# Patient Record
Sex: FEMALE | Race: WHITE | NOT HISPANIC OR LATINO | Employment: FULL TIME | URBAN - METROPOLITAN AREA
[De-identification: names, ages, dates, MRNs, and addresses within clinical notes are randomized per-mention and may not be internally consistent; named-entity substitution may affect disease eponyms.]

---

## 2017-08-22 PROBLEM — E04.1 THYROID NODULE: Status: ACTIVE | Noted: 2017-08-22

## 2017-08-22 PROCEDURE — 88175 CYTOPATH C/V AUTO FLUID REDO: CPT | Performed by: INTERNAL MEDICINE

## 2019-10-10 PROCEDURE — 88175 CYTOPATH C/V AUTO FLUID REDO: CPT | Performed by: INTERNAL MEDICINE

## 2022-10-13 ENCOUNTER — WALK IN (OUTPATIENT)
Dept: URGENT CARE | Age: 32
End: 2022-10-13
Attending: EMERGENCY MEDICINE

## 2022-10-13 VITALS
RESPIRATION RATE: 16 BRPM | OXYGEN SATURATION: 100 % | DIASTOLIC BLOOD PRESSURE: 79 MMHG | WEIGHT: 140 LBS | SYSTOLIC BLOOD PRESSURE: 129 MMHG | HEART RATE: 68 BPM | TEMPERATURE: 97.9 F

## 2022-10-13 DIAGNOSIS — J02.9 ACUTE PHARYNGITIS, UNSPECIFIED ETIOLOGY: Primary | ICD-10-CM

## 2022-10-13 DIAGNOSIS — H65.93 BILATERAL SEROUS OTITIS MEDIA, UNSPECIFIED CHRONICITY: ICD-10-CM

## 2022-10-13 PROCEDURE — 99202 OFFICE O/P NEW SF 15 MIN: CPT

## 2022-10-13 RX ORDER — MESALAMINE 1.2 G/1
4 TABLET, DELAYED RELEASE ORAL DAILY
COMMUNITY
Start: 2022-05-02

## 2022-10-13 RX ORDER — MERCAPTOPURINE 50 MG/1
TABLET ORAL
COMMUNITY

## 2022-10-13 RX ORDER — AZITHROMYCIN 500 MG/1
500 TABLET, FILM COATED ORAL DAILY
Qty: 3 TABLET | Refills: 0 | Status: SHIPPED | OUTPATIENT
Start: 2022-10-13 | End: 2022-10-16

## 2022-10-13 ASSESSMENT — ENCOUNTER SYMPTOMS
COUGH: 0
DIARRHEA: 0
HEADACHES: 0
ABDOMINAL PAIN: 0
FEVER: 0
SHORTNESS OF BREATH: 0
BACK PAIN: 0
RHINORRHEA: 1
DIZZINESS: 0
CHILLS: 0
VOMITING: 0
SORE THROAT: 1
BRUISES/BLEEDS EASILY: 0

## 2022-10-13 ASSESSMENT — PAIN SCALES - GENERAL: PAINLEVEL: 7

## 2023-06-10 ENCOUNTER — WALK IN (OUTPATIENT)
Dept: URGENT CARE | Age: 33
End: 2023-06-10
Attending: EMERGENCY MEDICINE

## 2023-06-10 VITALS
OXYGEN SATURATION: 100 % | RESPIRATION RATE: 16 BRPM | SYSTOLIC BLOOD PRESSURE: 121 MMHG | TEMPERATURE: 97.7 F | HEART RATE: 70 BPM | DIASTOLIC BLOOD PRESSURE: 81 MMHG

## 2023-06-10 DIAGNOSIS — R30.0 DYSURIA: Primary | ICD-10-CM

## 2023-06-10 DIAGNOSIS — N30.01 ACUTE CYSTITIS WITH HEMATURIA: ICD-10-CM

## 2023-06-10 LAB
APPEARANCE UR: CLEAR
BILIRUB UR QL STRIP: NEGATIVE
COLOR UR: ABNORMAL
GLUCOSE UR STRIP-MCNC: NEGATIVE MG/DL
HCG UR QL: NEGATIVE
HGB UR QL STRIP: ABNORMAL
KETONES UR STRIP-MCNC: NEGATIVE MG/DL
LEUKOCYTE ESTERASE UR QL STRIP: ABNORMAL
NITRITE UR QL STRIP: NEGATIVE
PH UR STRIP: 7 UNITS (ref 5–7)
PROT UR STRIP-MCNC: 100 MG/DL
SP GR UR STRIP: 1.01 (ref 1–1.03)
UROBILINOGEN UR STRIP-MCNC: 0.2 MG/DL

## 2023-06-10 PROCEDURE — 81003 URINALYSIS AUTO W/O SCOPE: CPT | Performed by: EMERGENCY MEDICINE

## 2023-06-10 PROCEDURE — 99212 OFFICE O/P EST SF 10 MIN: CPT

## 2023-06-10 PROCEDURE — 87086 URINE CULTURE/COLONY COUNT: CPT | Performed by: EMERGENCY MEDICINE

## 2023-06-10 PROCEDURE — 84703 CHORIONIC GONADOTROPIN ASSAY: CPT | Performed by: EMERGENCY MEDICINE

## 2023-06-10 RX ORDER — PHENAZOPYRIDINE HYDROCHLORIDE 100 MG/1
100 TABLET, FILM COATED ORAL 3 TIMES DAILY
Qty: 9 TABLET | Refills: 0 | Status: SHIPPED | OUTPATIENT
Start: 2023-06-10 | End: 2023-06-13

## 2023-06-10 RX ORDER — SULFAMETHOXAZOLE AND TRIMETHOPRIM 800; 160 MG/1; MG/1
1 TABLET ORAL 2 TIMES DAILY
Qty: 14 TABLET | Refills: 0 | Status: SHIPPED | OUTPATIENT
Start: 2023-06-10 | End: 2023-06-17

## 2023-06-10 ASSESSMENT — ENCOUNTER SYMPTOMS
BRUISES/BLEEDS EASILY: 0
CHILLS: 0
ABDOMINAL PAIN: 0
DIZZINESS: 0
SHORTNESS OF BREATH: 0
VOMITING: 0
FEVER: 0
HEADACHES: 0
DIARRHEA: 0
COUGH: 0
BACK PAIN: 0
SORE THROAT: 0

## 2023-06-12 LAB — BACTERIA UR CULT: ABNORMAL

## 2023-08-20 ENCOUNTER — WALK IN (OUTPATIENT)
Dept: URGENT CARE | Age: 33
End: 2023-08-20
Attending: EMERGENCY MEDICINE

## 2023-08-20 VITALS
OXYGEN SATURATION: 99 % | RESPIRATION RATE: 18 BRPM | TEMPERATURE: 99 F | SYSTOLIC BLOOD PRESSURE: 127 MMHG | DIASTOLIC BLOOD PRESSURE: 76 MMHG | HEART RATE: 99 BPM

## 2023-08-20 DIAGNOSIS — J01.90 ACUTE NON-RECURRENT SINUSITIS, UNSPECIFIED LOCATION: Primary | ICD-10-CM

## 2023-08-20 PROCEDURE — 99212 OFFICE O/P EST SF 10 MIN: CPT

## 2023-08-20 RX ORDER — AMOXICILLIN 875 MG/1
875 TABLET, COATED ORAL 2 TIMES DAILY
Qty: 14 TABLET | Refills: 0 | Status: SHIPPED | OUTPATIENT
Start: 2023-08-20 | End: 2023-08-27

## 2023-08-20 ASSESSMENT — ENCOUNTER SYMPTOMS
BACK PAIN: 0
NUMBNESS: 0
CHILLS: 0
SORE THROAT: 1
SHORTNESS OF BREATH: 0
FATIGUE: 1
ABDOMINAL PAIN: 0
COLOR CHANGE: 0
ACTIVITY CHANGE: 0
CONFUSION: 0
DIARRHEA: 0
POLYPHAGIA: 0
POLYDIPSIA: 0
EYE REDNESS: 0
WOUND: 0
SINUS PRESSURE: 1
EYE DISCHARGE: 0
NAUSEA: 0
DIZZINESS: 0
HEADACHES: 0
BRUISES/BLEEDS EASILY: 0
FACIAL SWELLING: 0
COUGH: 1
VOMITING: 0
WHEEZING: 0
EYE PAIN: 0
FEVER: 1

## 2023-08-20 ASSESSMENT — PAIN SCALES - GENERAL: PAINLEVEL: 0

## 2023-10-17 ENCOUNTER — WALK IN (OUTPATIENT)
Dept: URGENT CARE | Age: 33
End: 2023-10-17
Attending: EMERGENCY MEDICINE

## 2023-10-17 VITALS
RESPIRATION RATE: 18 BRPM | HEART RATE: 64 BPM | SYSTOLIC BLOOD PRESSURE: 132 MMHG | OXYGEN SATURATION: 100 % | DIASTOLIC BLOOD PRESSURE: 69 MMHG | TEMPERATURE: 98.5 F

## 2023-10-17 DIAGNOSIS — N39.0 ACUTE UTI: Primary | ICD-10-CM

## 2023-10-17 DIAGNOSIS — R31.0 HEMATURIA, GROSS: ICD-10-CM

## 2023-10-17 LAB
APPEARANCE UR: CLEAR
BILIRUB UR QL STRIP: NEGATIVE
COLOR UR: YELLOW
GLUCOSE UR STRIP-MCNC: NEGATIVE MG/DL
HCG UR QL: NEGATIVE
HGB UR QL STRIP: ABNORMAL
KETONES UR STRIP-MCNC: NEGATIVE MG/DL
LEUKOCYTE ESTERASE UR QL STRIP: ABNORMAL
NITRITE UR QL STRIP: NEGATIVE
PH UR STRIP: 7 UNITS (ref 5–7)
PROT UR STRIP-MCNC: NEGATIVE MG/DL
SP GR UR STRIP: 1.01 (ref 1–1.03)
UROBILINOGEN UR STRIP-MCNC: 0.2 MG/DL

## 2023-10-17 PROCEDURE — 87086 URINE CULTURE/COLONY COUNT: CPT | Performed by: EMERGENCY MEDICINE

## 2023-10-17 PROCEDURE — 84703 CHORIONIC GONADOTROPIN ASSAY: CPT | Performed by: EMERGENCY MEDICINE

## 2023-10-17 PROCEDURE — 99212 OFFICE O/P EST SF 10 MIN: CPT

## 2023-10-17 PROCEDURE — 81003 URINALYSIS AUTO W/O SCOPE: CPT | Performed by: EMERGENCY MEDICINE

## 2023-10-17 RX ORDER — CEPHALEXIN 500 MG/1
500 CAPSULE ORAL 3 TIMES DAILY
Qty: 21 CAPSULE | Refills: 0 | Status: SHIPPED | OUTPATIENT
Start: 2023-10-17 | End: 2023-10-24

## 2023-10-17 ASSESSMENT — PAIN SCALES - GENERAL
PAINLEVEL: 3
PAINLEVEL_OUTOF10: 3

## 2023-10-20 LAB — BACTERIA UR CULT: ABNORMAL

## 2023-10-20 RX ORDER — NITROFURANTOIN 25; 75 MG/1; MG/1
100 CAPSULE ORAL 2 TIMES DAILY
Qty: 14 CAPSULE | Refills: 0 | Status: SHIPPED | OUTPATIENT
Start: 2023-10-20

## 2024-09-03 ENCOUNTER — OFFICE VISIT (OUTPATIENT)
Dept: OBGYN CLINIC | Facility: CLINIC | Age: 34
End: 2024-09-03

## 2024-09-03 VITALS
BODY MASS INDEX: 22 KG/M2 | SYSTOLIC BLOOD PRESSURE: 110 MMHG | HEART RATE: 53 BPM | WEIGHT: 147.38 LBS | DIASTOLIC BLOOD PRESSURE: 70 MMHG

## 2024-09-03 DIAGNOSIS — K51.00 ULCERATIVE PANCOLITIS WITHOUT COMPLICATION (HCC): ICD-10-CM

## 2024-09-03 DIAGNOSIS — Z01.419 WELL WOMAN EXAM WITH ROUTINE GYNECOLOGICAL EXAM: Primary | ICD-10-CM

## 2024-09-03 DIAGNOSIS — Z31.69 PRE-CONCEPTION COUNSELING: ICD-10-CM

## 2024-09-03 PROCEDURE — 99385 PREV VISIT NEW AGE 18-39: CPT | Performed by: OBSTETRICS & GYNECOLOGY

## 2024-09-03 NOTE — PROGRESS NOTES
Angela Villeda is a 34 year old female  Patient's last menstrual period was 08/10/2024 (exact date).   Chief Complaint   Patient presents with    Physical     Annual/family planning   Presenting for well woman exam. Last pap smear was normal  per patient.  She is planning to start trying to conceive this fall. On mesalamine and mercaptopurine; discussed recommendation for preconception consult with MFM.     OBSTETRICS HISTORY:  OB History    Para Term  AB Living   0 0 0 0 0 0   SAB IAB Ectopic Multiple Live Births   0 0 0 0 0       GYNE HISTORY:  Patient's last menstrual period was 08/10/2024 (exact date).    History   Sexual Activity    Sexual activity: Yes    Partners: Male    Birth control/ protection: Condom        Pap Date: 10/01/23  Pap Result Notes: per pt results  Normal      MEDICAL HISTORY:  Past Medical History:    Ulcerative colitis, unspecified    asx x 5 years, on asachol, 6MP         SURGICAL HISTORY:  History reviewed. No pertinent surgical history.    SOCIAL HISTORY:  Social History     Socioeconomic History    Marital status: Single     Spouse name: Not on file    Number of children: Not on file    Years of education: Not on file    Highest education level: Not on file   Occupational History    Not on file   Tobacco Use    Smoking status: Never    Smokeless tobacco: Never   Substance and Sexual Activity    Alcohol use: Yes     Alcohol/week: 2.0 standard drinks of alcohol     Types: 2 Glasses of wine per week    Drug use: No    Sexual activity: Yes     Partners: Male     Birth control/protection: Condom   Other Topics Concern    Not on file   Social History Narrative    Not on file     Social Determinants of Health     Financial Resource Strain: Not on file   Food Insecurity: Not on file   Transportation Needs: Not on file   Physical Activity: Not on file   Stress: Not on file   Social Connections: Not on file   Housing Stability: Not on file         Depression Screening  (PHQ-2/PHQ-9): Over the LAST 2 WEEKS   Little interest or pleasure in doing things (over the last two weeks)?: Not at all    Feeling down, depressed, or hopeless (over the last two weeks)?: Not at all    PHQ-2 SCORE: 0           MEDICATIONS:    Current Outpatient Medications:     MULTIPLE VITAMIN OR, Take by mouth daily., Disp: , Rfl:     mesalamine 1.2 g Oral Tab EC, Take 1 tablet (1.2 g total) by mouth 2 (two) times daily., Disp: , Rfl:     MERCAPTOPURINE OR, 50 mL daily., Disp: , Rfl:     sulfamethoxazole-trimethoprim DS (BACTRIM DS) 800-160 MG Oral Tab per tablet, Take 1 tablet by mouth 2 (two) times daily. (Patient not taking: Reported on 9/3/2024), Disp: 10 tablet, Rfl: 0    ALLERGIES:  No Known Allergies      Review of Systems:  Review of Systems   All other systems reviewed and are negative.       Vitals:    09/03/24 0953   BP: 110/70   Pulse: 53       PHYSICAL EXAM:   Physical Exam  Vitals reviewed.   Constitutional:       Appearance: Normal appearance.   HENT:      Head: Atraumatic.   Eyes:      Pupils: Pupils are equal, round, and reactive to light.   Pulmonary:      Effort: Pulmonary effort is normal.   Chest:   Breasts:     Right: Normal. No bleeding, inverted nipple, mass, nipple discharge, skin change or tenderness.      Left: Normal. No bleeding, inverted nipple, mass, nipple discharge, skin change or tenderness.   Abdominal:      General: Abdomen is flat.      Palpations: Abdomen is soft.      Tenderness: There is no abdominal tenderness.   Genitourinary:     General: Normal vulva.      Exam position: Lithotomy position.      Labia:         Right: No rash, tenderness, lesion or injury.         Left: No rash, tenderness, lesion or injury.       Vagina: Normal.      Cervix: Normal.      Uterus: Normal. Not tender.       Adnexa: Right adnexa normal and left adnexa normal.        Right: No tenderness or fullness.          Left: No tenderness or fullness.     Lymphadenopathy:      Upper Body:      Right  upper body: No supraclavicular, axillary or pectoral adenopathy.      Left upper body: No supraclavicular, axillary or pectoral adenopathy.   Skin:     General: Skin is warm and dry.   Neurological:      General: No focal deficit present.      Mental Status: She is alert and oriented to person, place, and time.   Psychiatric:         Mood and Affect: Mood normal.         Behavior: Behavior normal.         Thought Content: Thought content normal.         Judgment: Judgment normal.           Assessment & Plan:  Angela was seen today for physical.    Diagnoses and all orders for this visit:    Well woman exam with routine gynecological exam    Pre-conception counseling  -     Maternal Fetal Medicine Referral - [place] Location    Ulcerative pancolitis without complication (HCC)  -     Maternal Fetal Medicine Referral - [place] Location        Requested Prescriptions      No prescriptions requested or ordered in this encounter       Next pap smear due 2026. Encourage SBE. Recommend exams yearly. Referral to [facility]

## 2024-10-16 ENCOUNTER — HOSPITAL ENCOUNTER (OUTPATIENT)
Dept: PERINATAL CARE | Facility: HOSPITAL | Age: 34
Discharge: HOME | End: 2024-10-16
Attending: OBSTETRICS & GYNECOLOGY
Payer: COMMERCIAL

## 2024-10-16 DIAGNOSIS — K51.00 ULCERATIVE PANCOLITIS WITHOUT COMPLICATION (HCC): ICD-10-CM

## 2024-10-16 DIAGNOSIS — Z31.69 PRE-CONCEPTION COUNSELING: Primary | ICD-10-CM

## 2024-10-16 NOTE — PROGRESS NOTES
Reason for Consult:   Dear Dr. Parish,    Thank you for requesting ultrasound evaluation and maternal fetal medicine consultation on Angela Villeda.  As you are aware she is a 34 year old female.  A maternal-fetal medicine consultation was requested secondary to  ulcerative colitis considering pregnancy.  Her prenatal records and labs were reviewed.    Review of History:     OB History:    OB History    Para Term  AB Living   0 0 0 0 0 0   SAB IAB Ectopic Multiple Live Births   0 0 0 0 0             Allergies:  Allergies[1]   Current Meds:  Current Outpatient Medications   Medication Sig Dispense Refill    MULTIPLE VITAMIN OR Take by mouth daily.      sulfamethoxazole-trimethoprim DS (BACTRIM DS) 800-160 MG Oral Tab per tablet Take 1 tablet by mouth 2 (two) times daily. (Patient not taking: Reported on 9/3/2024) 10 tablet 0    mesalamine 1.2 g Oral Tab EC Take 1 tablet (1.2 g total) by mouth 2 (two) times daily.      MERCAPTOPURINE OR 50 mL daily.          HISTORY:  Past Medical History:    Ulcerative colitis, unspecified    asx x 5 years, on asachol, 6MP      No past surgical history on file.   No family history on file.   Social History     Socioeconomic History    Marital status: Single   Tobacco Use    Smoking status: Never    Smokeless tobacco: Never   Substance and Sexual Activity    Alcohol use: Yes     Alcohol/week: 2.0 standard drinks of alcohol     Types: 2 Glasses of wine per week    Drug use: No    Sexual activity: Yes     Partners: Male     Birth control/protection: Condom        NARRATIVE:   LMP 08/10/2024 (Exact Date)            Alert and Oriented.  No acute distress            DISCUSSION  During her visit we discussed and reviewed the following issues:           Multiple studies have described pregnancy and obstetrical outcomes in women with Inflamatory bowel disease(IBD). These data suggest that women with IBD are at somewhat increased risk for worse obstetrical and  pregnancy-related outcomes.   The course of the ulcerative colitis or Crohn's disease during pregnancy appears to be determined in part by the activity of the disease at conception. Patients in remission at the time of conception are likely to remain in remission during pregnancy. Approximately one-third of women relapse during the pregnancy (more commonly during the first trimester), which is similar to the rate observed over a nine month period in nonpregnant women. Furthermore, remission achieved during pregnancy is likely to be sustained throughout the remainder of the pregnancy.  So women with ulcerative colitis who desire pregnancy should try to conceive at a time when the disease is in remission.       Studies on IBD (both ulcerative colitis and Crohn's disease) found an increased incidence of  births and low birth weight compared to the general population.  The risk of congenital abnormalities or stillbirth does not appear to be increased.  Considering the available data and clinical experience many obstetricians avoid creating an episiotomy and prefer  section in patients with active perianal disease because of the concern that a fistula will arise from the incision. In patients with quiescent disease, the mode of delivery should be dictated by obstetrical concerns.       The 6-methylmercaptopurine (6-MMP) and 6-thioguanine (6-TGN) metabolites can be detected in cord blood following maternal use during pregnancy. Infant serum concentrations were undetectable by 6 weeks of age in one study (Kirt ).  ??r???t???ri?? may be associated with adverse fetal outcomes. An increased risk of miscarriage has been noted with m?r???t???rine administration during the first trimester; adverse events, including miscarriage and stillbirth, have also been noted with second and third trimester use. An increased risk of stillbirth,  birth, and infants large for gestational age may be observed with  maternal use of thiopurines (Thibodeaux 2021).  Intrahepatic cholestasis of pregnancy (ICP) has been associated with thiopurine use. In one study, patients with a metabolite ratio of 6-MMP to 6-TGN >11 (referred to as thiopurine shunting) was associated with the risk of ICP (Ang 2024). The pharmacokinetic properties of thiopurines may be altered by pregnancy. A decrease in 6-TGN and an increase in 6-MMP was observed in the second trimester (Kirt 2021). Closely monitor liver transaminases and thiopurine metabolites during pregnancy. Split dosing (taking half the total daily dose every 12 hours) may also prevent shunting (Ang 2024). Patients with symptoms of ICP and elevated bile acid levels should discontinue treatment. Symptoms improve after the thiopurine is discontinued (FDA 2024).  ??r???t?p?ri?e is also used (off label) for the treatment of ulcerative colitis and Crohn disease. The risk of adverse fetal events is decreased with monotherapy. In addition, maternal use to maintain remission may improve pregnancy outcomes. Patients with inflammatory bowel disease who are on maintenance therapy with m?r???t???ri?e monotherapy may continue treatment during pregnancy. Initiating treatment during pregnancy is not recommended. Combination therapy is also not recommended (AGA [Guillermo 2019]; Baileyer 2019; Restellini 2020).      IMPRESSION:   1. Ulcerative colitis  2. Preconceptual consultation    RECOMMENDATIONS:   1.  May continue Mercaptopurine if needed during pregnancy    Thank you for allowing me to participate in the care of your patient.  Please do not hesitate to call with any questions or concerns.     Total time spent   30  minutes this calendar day which includes preparing to see the patient including chart review, obtaining and/or reviewing additional medical history, performing a physical exam and evaluation, documenting clinical information in the electronic medical record, independently  interpreting results, counseling the patient, communicating results to the patient/family/caregiver and coordinating care.    Kang Centeno D.O.  Maternal Fetal Medicine     Note to patient and family:  The 21st Century Cures Act makes medical notes available to patients in the interest of transparency.  However, please be advised that this is a medical document.  It is intended as a peer to peer communication.  It is written in medical language and may contain abbreviations or verbiage that are technical and unfamiliar.  It may appear blunt or direct.  Medical documents are intended to carry relevant information, facts as evident, and the clinical opinion of the practitioner.         [1] No Known Allergies

## 2025-02-22 ENCOUNTER — NURSE ONLY (OUTPATIENT)
Dept: OBGYN CLINIC | Facility: CLINIC | Age: 35
End: 2025-02-22
Payer: COMMERCIAL

## 2025-02-22 DIAGNOSIS — Z34.01 ENCOUNTER FOR SUPERVISION OF NORMAL FIRST PREGNANCY IN FIRST TRIMESTER (HCC): Primary | ICD-10-CM

## 2025-02-22 RX ORDER — CHOLECALCIFEROL (VITAMIN D3) 25 MCG
1 TABLET,CHEWABLE ORAL DAILY
COMMUNITY

## 2025-02-22 NOTE — PROGRESS NOTES
Pt had OBN appt today with no complaints. Normal PN labs ordered plus varicella and hcg qual. Pt advised all labs must be completed and resulted prior to NPN appt. If labs are not completed and resulted the NPN appt will be cancelled. Pt informed again of both male and female providers and the need to rotate PN appt with all providers since OB on-call will be the one that delivers her. Assisted pt with scheduling NPN appt with MD.     Pt reports one episode of spotting 1-2 weeks ago after intercourse. Pt stated there was a small amount of blood on panty liner, brown in color. Denies pain/cramping. Pt instructed to monitor and call if bleeding recurs. Pt advised to avoid intercourse until first appt with MD.       Height: 5'8\"  Weight: 143 lb  BMI: 21.75    Partner's name is Chato Villeda  contact # [phone]; race:    Occupation:      MEDICAL HISTORY    Anemia History of anemia     Anesthetic complications No    Anxiety/Depression  No    Autoimmune Disorder Ulcerative Colitis     Asthma  No    Cancer No    Diabetes  No    Gyne/breast Surgery No    Heart Disease No    Hepatitis/Liver Disease  No    History of blood transfusion No    History of abnormal pap No    Hypertension  No    Infertility  No    Kidney Disease/Frequent UTIs  History of frequent UTIs    Medication Allergies No    Latex Allergies No    Food Allergies  No    Neurological Disorder/Epilepsy No    Operations/Hospitalizations Yes wisdom teeth removed in 2010    Surgery for gum recession 2005    Moles removed on skin, benign.     TB exposure  No    Thyroid Dysfunction Nodules on thyroid- gets thyroid ultrasounds yearly to check on the size.     Trauma/Violence  No    Uterine Anomaly  No    Uterine Fibroids  No    Variocosities/DVTs No    Smoker No    Drug usage in prior year No    Alcohol Socially prior to pregnancy     Would you accept a blood transfusion? If no, are you a Restorationist? Pt stated she would accept blood  products in event of emergency             INFECTION HISTORY    Chlamydia No    Pt or partner have hx of Genital Herpes No    Gonorrhea No    Hepatitis B No    HIV No    HPV No    MRSA No    Syphilis No    Tattoos No    Live with someone or Exposed to TB No    Rash or viral illness since LMP  Yes-pt stated she had the flu last week (2/10/25)    Varicella No    Pets No        GENETICS SCREENING    Genetic Screening    Genetic Screening/Teratology Counseling- Includes patient, baby's father, or anyone in either family with:  Patient's age 35 years or older as of estimated date of delivery: No     Thalassemia (Italian, Greek, Mediterranean, or  background): MCV less than 80: No     Neural tube defect (Meningomyelocele, Spina bifida, or Anencephaly): No     Congenital heart defect: Yes (Comment: maternal uncle with open heart surgery as a child)     Down syndrome: No     Job-Sachs (Ashkenazi Taoist, Cajun, Japanese [place]): No     Canavan disease (Ashkenazi Taoist): No     Familial dysautonomia (Ashkenazi Taoist): No     Sickle cell disease or trait (): No     Hemophilia or other blood disorders: No     Muscular dystrophy: No    Cystic fibrosis: No     [place]'s chorea: No     Intellectual disability and/or autism: No     Other inherited genetic or chromosomal disorder: No     Maternal metabolic disorder (eg. Type 1 diabetes, PKU): No     Patient or baby's father had child with birth defects not listed above: No     Recurrent pregnancy loss, or a stillbirth: Yes (Comment: FOBs mother with 2 miscarriages)     Medications (including supplements, vitamins, herbs, or OTC drugs)/illicit/recreational drugs/alcohol since last menstrual period: No                 MISC    Infant vaccinations  Yes      Pt. Has answered NO 5P questions and has NO  risk factors.    Pt. Given What pregnant women need to know handout.

## 2025-02-28 ENCOUNTER — LAB ENCOUNTER (OUTPATIENT)
Dept: LAB | Facility: HOSPITAL | Age: 35
End: 2025-02-28
Attending: OBSTETRICS & GYNECOLOGY
Payer: COMMERCIAL

## 2025-02-28 DIAGNOSIS — Z34.01 ENCOUNTER FOR SUPERVISION OF NORMAL FIRST PREGNANCY IN FIRST TRIMESTER (HCC): ICD-10-CM

## 2025-02-28 LAB
ANTIBODY SCREEN: NEGATIVE
BASOPHILS # BLD AUTO: 0.04 X10(3) UL (ref 0–0.2)
BASOPHILS NFR BLD AUTO: 0.6 %
DEPRECATED RDW RBC AUTO: 45.1 FL (ref 35.1–46.3)
EOSINOPHIL # BLD AUTO: 0.07 X10(3) UL (ref 0–0.7)
EOSINOPHIL NFR BLD AUTO: 1 %
ERYTHROCYTE [DISTWIDTH] IN BLOOD BY AUTOMATED COUNT: 12.6 % (ref 11–15)
HBV SURFACE AG SER-ACNC: <0.1 [IU]/L
HBV SURFACE AG SERPL QL IA: NONREACTIVE
HCG SERPL QL: POSITIVE
HCT VFR BLD AUTO: 36 %
HCV AB SERPL QL IA: NONREACTIVE
HGB BLD-MCNC: 12.2 G/DL
IMM GRANULOCYTES # BLD AUTO: 0.02 X10(3) UL (ref 0–1)
IMM GRANULOCYTES NFR BLD: 0.3 %
LYMPHOCYTES # BLD AUTO: 1.65 X10(3) UL (ref 1–4)
LYMPHOCYTES NFR BLD AUTO: 23.5 %
MCH RBC QN AUTO: 33 PG (ref 26–34)
MCHC RBC AUTO-ENTMCNC: 33.9 G/DL (ref 31–37)
MCV RBC AUTO: 97.3 FL
MONOCYTES # BLD AUTO: 0.37 X10(3) UL (ref 0.1–1)
MONOCYTES NFR BLD AUTO: 5.3 %
NEUTROPHILS # BLD AUTO: 4.88 X10 (3) UL (ref 1.5–7.7)
NEUTROPHILS # BLD AUTO: 4.88 X10(3) UL (ref 1.5–7.7)
NEUTROPHILS NFR BLD AUTO: 69.3 %
PLATELET # BLD AUTO: 351 10(3)UL (ref 150–450)
RBC # BLD AUTO: 3.7 X10(6)UL
RH BLOOD TYPE: POSITIVE
RUBV IGG SER QL: POSITIVE
RUBV IGG SER-ACNC: 11 IU/ML (ref 10–?)
T PALLIDUM AB SER QL IA: NONREACTIVE
WBC # BLD AUTO: 7 X10(3) UL (ref 4–11)

## 2025-02-28 PROCEDURE — 86900 BLOOD TYPING SEROLOGIC ABO: CPT

## 2025-02-28 PROCEDURE — 86850 RBC ANTIBODY SCREEN: CPT

## 2025-02-28 PROCEDURE — 86780 TREPONEMA PALLIDUM: CPT

## 2025-02-28 PROCEDURE — 86787 VARICELLA-ZOSTER ANTIBODY: CPT

## 2025-02-28 PROCEDURE — 87340 HEPATITIS B SURFACE AG IA: CPT

## 2025-02-28 PROCEDURE — 85025 COMPLETE CBC W/AUTO DIFF WBC: CPT

## 2025-02-28 PROCEDURE — 86901 BLOOD TYPING SEROLOGIC RH(D): CPT

## 2025-02-28 PROCEDURE — 86762 RUBELLA ANTIBODY: CPT

## 2025-02-28 PROCEDURE — 36415 COLL VENOUS BLD VENIPUNCTURE: CPT

## 2025-02-28 PROCEDURE — 87086 URINE CULTURE/COLONY COUNT: CPT

## 2025-02-28 PROCEDURE — 87389 HIV-1 AG W/HIV-1&-2 AB AG IA: CPT

## 2025-02-28 PROCEDURE — 86803 HEPATITIS C AB TEST: CPT

## 2025-02-28 PROCEDURE — 84703 CHORIONIC GONADOTROPIN ASSAY: CPT

## 2025-03-03 LAB — VZV IGG SER IA-ACNC: 0.43 (ref 1–?)

## 2025-03-10 ENCOUNTER — INITIAL PRENATAL (OUTPATIENT)
Dept: OBGYN CLINIC | Facility: CLINIC | Age: 35
End: 2025-03-10
Payer: COMMERCIAL

## 2025-03-10 ENCOUNTER — TELEPHONE (OUTPATIENT)
Dept: OBGYN CLINIC | Facility: CLINIC | Age: 35
End: 2025-03-10

## 2025-03-10 VITALS
DIASTOLIC BLOOD PRESSURE: 72 MMHG | SYSTOLIC BLOOD PRESSURE: 122 MMHG | HEART RATE: 71 BPM | BODY MASS INDEX: 23 KG/M2 | WEIGHT: 148.38 LBS

## 2025-03-10 DIAGNOSIS — Z34.01 ENCOUNTER FOR SUPERVISION OF NORMAL FIRST PREGNANCY IN FIRST TRIMESTER (HCC): Primary | ICD-10-CM

## 2025-03-10 DIAGNOSIS — K51.919 ULCERATIVE COLITIS WITH COMPLICATION, UNSPECIFIED LOCATION (HCC): ICD-10-CM

## 2025-03-10 DIAGNOSIS — O09.521 AMA (ADVANCED MATERNAL AGE) MULTIGRAVIDA 35+, FIRST TRIMESTER (HCC): Primary | ICD-10-CM

## 2025-03-10 LAB
APPEARANCE: CLEAR
BILIRUBIN: NEGATIVE
GLUCOSE (URINE DIPSTICK): NEGATIVE MG/DL
KETONES (URINE DIPSTICK): NEGATIVE MG/DL
LEUKOCYTES: NEGATIVE
MULTISTIX LOT#: NORMAL NUMERIC
NITRITE, URINE: NEGATIVE
OCCULT BLOOD: NEGATIVE
PH, URINE: 6 (ref 4.5–8)
PROTEIN (URINE DIPSTICK): NEGATIVE MG/DL
SPECIFIC GRAVITY: 1.01 (ref 1–1.03)
URINE-COLOR: YELLOW
UROBILINOGEN,SEMI-QN: 0.2 MG/DL (ref 0–1.9)

## 2025-03-10 PROCEDURE — 81002 URINALYSIS NONAUTO W/O SCOPE: CPT | Performed by: OBSTETRICS & GYNECOLOGY

## 2025-03-10 NOTE — PROGRESS NOTES
The following individual(s) verbally consented to be recorded using ambient AI listening technology and understand that they can each withdraw their consent to this listening technology at any point by asking the clinician to turn off or pause the recording:    Patient name: Angela Villeda  Additional names:  JAXSON

## 2025-03-10 NOTE — PROGRESS NOTES
History of Present Illness      Addison at visit. Feeling well. They want first trimester screen. Discussed Ulcerative colitis plan.

## 2025-03-11 LAB
C TRACH DNA SPEC QL NAA+PROBE: NEGATIVE
N GONORRHOEA DNA SPEC QL NAA+PROBE: NEGATIVE

## 2025-03-12 LAB — T VAGINALIS RRNA SPEC QL NAA+PROBE: NEGATIVE

## 2025-04-09 ENCOUNTER — ROUTINE PRENATAL (OUTPATIENT)
Dept: OBGYN CLINIC | Facility: CLINIC | Age: 35
End: 2025-04-09
Payer: COMMERCIAL

## 2025-04-09 VITALS
DIASTOLIC BLOOD PRESSURE: 74 MMHG | WEIGHT: 149.81 LBS | BODY MASS INDEX: 23 KG/M2 | HEART RATE: 58 BPM | SYSTOLIC BLOOD PRESSURE: 116 MMHG

## 2025-04-09 DIAGNOSIS — K51.00 ULCERATIVE PANCOLITIS WITHOUT COMPLICATION (HCC): ICD-10-CM

## 2025-04-09 DIAGNOSIS — Z34.92 ENCOUNTER FOR SUPERVISION OF NORMAL PREGNANCY IN SECOND TRIMESTER, UNSPECIFIED GRAVIDITY (HCC): Primary | ICD-10-CM

## 2025-04-09 DIAGNOSIS — Z3A.13 13 WEEKS GESTATION OF PREGNANCY (HCC): ICD-10-CM

## 2025-04-09 LAB
BILIRUBIN: NEGATIVE
GLUCOSE (URINE DIPSTICK): NEGATIVE MG/DL
KETONES (URINE DIPSTICK): NEGATIVE MG/DL
MULTISTIX LOT#: ABNORMAL NUMERIC
NITRITE, URINE: NEGATIVE
OCCULT BLOOD: NEGATIVE
PH, URINE: 7 (ref 4.5–8)
PROTEIN (URINE DIPSTICK): NEGATIVE MG/DL
SPECIFIC GRAVITY: 1 (ref 1–1.03)
UROBILINOGEN,SEMI-QN: 0.2 MG/DL (ref 0–1.9)

## 2025-04-09 PROCEDURE — 81002 URINALYSIS NONAUTO W/O SCOPE: CPT | Performed by: NURSE PRACTITIONER

## 2025-04-09 RX ORDER — CHOLECALCIFEROL (VITAMIN D3) 25 MCG
1 TABLET,CHEWABLE ORAL DAILY
COMMUNITY

## 2025-04-09 NOTE — PROGRESS NOTES
Feeling well. Maternal Fetal Medicine appointment tomorrow. GI recommending completing hep A vaccine - reviewed CDC safety, okay to get in pregnancy. No ulcerative colitis concerns    Rto 4 weeks

## 2025-04-10 ENCOUNTER — HOSPITAL ENCOUNTER (OUTPATIENT)
Dept: PERINATAL CARE | Facility: HOSPITAL | Age: 35
Discharge: HOME | End: 2025-04-10
Attending: OBSTETRICS & GYNECOLOGY
Payer: COMMERCIAL

## 2025-04-10 VITALS
WEIGHT: 150 LBS | BODY MASS INDEX: 23 KG/M2 | SYSTOLIC BLOOD PRESSURE: 120 MMHG | HEART RATE: 84 BPM | DIASTOLIC BLOOD PRESSURE: 72 MMHG

## 2025-04-10 DIAGNOSIS — K51.00 ULCERATIVE PANCOLITIS WITHOUT COMPLICATION (HCC): ICD-10-CM

## 2025-04-10 DIAGNOSIS — K51.919 ULCERATIVE COLITIS WITH COMPLICATION, UNSPECIFIED LOCATION (HCC): ICD-10-CM

## 2025-04-10 DIAGNOSIS — Z36.9 FIRST TRIMESTER SCREENING (HCC): Primary | ICD-10-CM

## 2025-04-10 DIAGNOSIS — O09.511 AMA (ADVANCED MATERNAL AGE) PRIMIGRAVIDA 35+, FIRST TRIMESTER (HCC): ICD-10-CM

## 2025-04-10 DIAGNOSIS — Z36.9 FIRST TRIMESTER SCREENING (HCC): ICD-10-CM

## 2025-04-10 PROCEDURE — 36415 COLL VENOUS BLD VENIPUNCTURE: CPT

## 2025-04-10 PROCEDURE — 76813 OB US NUCHAL MEAS 1 GEST: CPT | Performed by: OBSTETRICS & GYNECOLOGY

## 2025-04-10 NOTE — PROGRESS NOTES
Outpatient Maternal-Fetal Medicine Consultation    Dear Dr. Garibay,    Thank you for requesting ultrasound evaluation and maternal fetal medicine consultation on your patient Angela Villeda.  As you are aware she is a 35 year old female with a Strickland pregnancy at 13w5d.  A maternal-fetal medicine consultation was requested secondary to ulcerative colitis and desire for aneuploidy screening.  Her prenatal records and labs were reviewed.    HISTORY  OB History    Para Term  AB Living   1 0 0 0 0 0   SAB IAB Ectopic Multiple Live Births   0 0 0 0 0     # 1 - Date: None, Sex: None, Weight: None, GA: None, Type: None, Apgar1: None, Apgar5: None, Living: None, Birth Comments: None    Past Medical History  The patient  has a past medical history of History of anemia, History of frequent urinary tract infections, Thyroid nodule, and Ulcerative colitis, unspecified (2005).    Past Surgical History  The patient  has a past surgical history that includes wisdom teeth removed () and other surgical history.    Family History  The patient She indicated that her mother is alive. She indicated that her father is alive. She indicated that her sister is alive. She indicated that the status of her maternal grandmother is unknown. She indicated that the status of her maternal grandfather is unknown. She indicated that the status of her paternal grandmother is unknown.      Medications: Medications - Current[1]  Allergies: Allergies[2]      PHYSICAL EXAMINATION:  /72   Pulse 84   Wt 150 lb (68 kg)   LMP 2025   BMI 22.81 kg/m²   General: alert and oriented,no acute distress  Abdomen: gravid, soft, non-tender  Extremities: non-tender, no edema      OBSTETRIC ULTRASOUND  The patient had a first trimester ultrasound today which I interpreted the results and reviewed them with the patient.    Single IUP with cardiac activity 152 bpm  Amniotic fluid is normal.  Placenta location is anterior  The  CRL is consistent with gestational age. Nasal bone present. The nuchal translucency measures 2 mm. This is within normal limits.   The maternal uterus and left ovary appear normal. The right ovary was unable to be visualized.     See imaging tab for the complete US report.    DISCUSSION  During her visit we discussed and reviewed the following issues:  CELL FREE FETAL DNA TESTING INFORMATION  Sequencing cfDNA is a screening test; it has false-positive and negative results and does not test for all genetic syndromes or all aneuploidies: it tests for trisomy 21, 18, and 13 and sometimes sex chromosome aneuploidy. Diagnostic procedures, such as amniocentesis, obtain fetal cells, and subsequent testing by karyotyping or microarray can diagnose all aneuploidies; can distinguish between full trisomy and trisomy caused by an unbalanced chromosomal rearrangement; and can detect mosaicism and microdeletions/microduplications (microarray) and, via amniotic fluid AFP and acetylcholinesterase, open neural tube defects. If there are one or more structural anomalies on ultrasound examination, a microarray on amniocytes is the preferred test     Both the mother and the fetal-placental unit produce cfDNA. The primary source of so-called \"fetal\" cfDNA in the maternal circulation is thought to be apoptosis of placental cells (syncytiotrophoblast), while maternal hematopoietic cells are the source of most maternal cfDNA. A lesser source is apoptosis of fetal erythroblasts generating cfDNA in the fetal circulation, and these fragments can cross the placenta and enter the maternal circulation. Since the fetus and the placenta originate from a single fertilized egg, they are usually genetically identical, but differences between the placenta and fetus are important sources of discordant cfDNA test results (eg, confined placental mosaicism).  Trisomy 21, 18, and 13 -- cfDNA is the most sensitive screening option for these aneuploidies.  Performance varies by trisomy and by methodology but is rather similar in both high- and low-risk women. Based on multiple meta-analyses, the consensus DRs and FPRs were as follows:  ?Trisomy 21 - DR 99.5 percent, FPR 0.05 percent   ?Trisomy 18 - DR 97.7 percent, FPR 0.04 percent   ?Trisomy 13 - DR 96.1 percent, FPR 0.06 percent     Low cell fraction  Cell-free DNA test failures may occur because of the complex laboratory processing procedures, early gestational age (less than 9-10 weeks), the types of laboratory methods, and the presence of a genetic condition, particularly trisomy 13 or 18 and are also seen more frequently in patients with high BMI, increasing maternal age, certain racial backgrounds (seen more frequently in Black women and South  women in comparison to white women), and IVF pregnancies, 45 as well as maternal drug exposure (low-molecular-weight heparin)  Some aneuploidies also have a low cell fraction.     1 to 5 percent of cfDNA tests do not yield a result, and obese women are at increased risk of receiving a test failure.  The patient has three options in this setting:  Repeat the cfDNA test, if allowed by the laboratory (some failures, like large regions of homozygosity or dizygotic twins, will always cause the test to fail and repeat testing is not an option). Repeat testing, when allowed, is successful in 50 to 80 percent of cases .  Standard serum marker/ultrasound screening.  Invasive procedure (amniocentesis, CVS) and diagnostic testing (karyotyping/microarray).     False-positive cell-free DNA test results occur because of confined placental mosaicism, which can be associated with an increased risk of fetal growth restriction. High or low fetal fraction has been associated with adverse pregnancy outcomes in some studies.    INFLAMMATORY BOWEL DISEASE IN PREGNANCY     She has ulcerative colitis that was diagnosed when she was age 12-13.  Her doctor is Dr. Card through  [facility].  She also reports her colonoscopy in the fall 2024 was negative for any inflammation.  She is on mesalamine and mercaptopurine 25 mg twice daily.    Her GI physician had her stay on this medication regimen knowing that she was planning pregnancy.    INTRODUCTION  Women with inflammatory bowel disease (IBD) are at an increased risk for worse obstetric and medical outcomes as compared with the age-matched general population.     EFFECT OF PREGNANCY ON IBD  For patients with quiescent IBD at conception, the course of IBD is approximately the same as in nonpregnant patients. Patients with Crohn disease have a similar disease course during pregnancy and the postpartum as nonpregnant women with IBD. However, for patients with ulcerative colitis, there is greater disease activity during pregnancy and the postpartum than the nonpregnant patient. The reason for this is unclear and may be associated with smoking cessation in the Crohn patient and cytokines secreted by the placenta.      Approximately one-third of women with quiescent disease at conception relapse during the pregnancy. Relapses are more common during the first trimester. However, remission achieved during pregnancy is likely to be sustained throughout the remainder of the pregnancy.     In contrast, approximately 70 percent of patients with active disease at conception are likely to have continued or worsening symptoms during pregnancy.     EFFECT OF IBD ON PREGNANCY   Pregnant women with IBD may be at increased risk for antepartum hemorrhage, low birth weight infants, and premature delivery. However, the risk of congenital abnormalities does not appear to be increased.     The degree of disease activity may account, in part, for the poor pregnancy-related outcomes.  The risk of poor pregnancy outcomes in women with IBD is greatest in those who have active disease at the time of conception, and in whom remission may be difficult to achieve during  pregnancy.     MEDICATIONS DURING PREGNANCY AND LACTATION  Introduction  The choice of antiinflammatory and immunosuppressive used during pregnancy and lactation should be based upon their relative safety and indications as well as the risk of relapse of inflammatory bowel disease (IBD) if the medications are discontinued.      Active IBD itself is associated with poor pregnancy outcomes, and therefore the risks associated with discontinuing some medications (eg, azathioprine, anti-tumor necrosis factor [anti-TNF] agents) are higher than the known risks of the medications themselves.     NUTRITION  Consultation with a nutritionist should be considered to offer advice on eating a well-balanced, healthy diet.  Folate supplementation (2 mg daily) should be recommended in patients with IBD on low residue diets, with ileal involvement, and patients on medications that interfere with folic acid metabolism (eg, sulfasalazine).  Iron and B12 levels should therefore be checked in the first trimester and supplementation should be provided as needed     MODE OF DELIVERY  Patients with active perineal disease or rectal involvement with Crohn disease should undergo  delivery in order to avoid perineal trauma from vaginal birth that can trigger or worsen existing perineal disease. The mode of delivery in all other patients with inflammatory bowel disease should be dictated by obstetric necessity. In patients with an ileoanal anastomosis or J pouch, many advocate planned  delivery as well, though data suggest no long-term worsening of pouch function.\Vaginal delivery can be attempted in women with a colostomy, ileostomy, or continent ileostomy and has not been associated with an increased risk of ostomy complications.    Mercaptopurine is approved for use as part of combination maintenance therapy for acute lymphoblastic leukemia. Product labeling recommends patients who could become pregnant use effective  contraception during mercaptopurine treatment and for 6 months after the last mercaptopurine dose. Patients with partners who could become pregnant should be advised to use effective contraception during therapy and for 3 months after the last dose of mercaptopurine.  Mercaptopurine is also used (off label) for the treatment of ulcerative colitis and Crohn disease; monotherapy use for these indications may be continued in patients planning a pregnancy (Ariel 2018). Mercaptopurine does not decrease fertility in patients with inflammatory bowel disease (AGA [Guillermo 2019]; Ariel 2018).  Pregnancy Considerations   The 6-methylmercaptopurine (6-MMP) and 6-thioguanine (6-TGN) metabolites can be detected in cord blood following maternal use during pregnancy. Infant serum concentrations were undetectable by 6 weeks of age in one study (Kirt ).  Mercaptopurine may be associated with adverse fetal outcomes. An increased risk of miscarriage has been noted with mercaptopurine administration during the first trimester; adverse events, including miscarriage and stillbirth, have also been noted with second and third trimester use. An increased risk of stillbirth,  birth, and infants large for gestational age may be observed with maternal use of thiopurines (Thibodeaux 202).  Intrahepatic cholestasis of pregnancy (ICP) has been associated with thiopurine use. In one study, patients with a metabolite ratio of 6-MMP to 6-TGN >11 (referred to as thiopurine shunting) was associated with the risk of ICP (Ang 202). The pharmacokinetic properties of thiopurines may be altered by pregnancy. A decrease in 6-TGN and an increase in 6-MMP was observed in the second trimester (Kirt ). Closely monitor liver transaminases and thiopurine metabolites during pregnancy. Split dosing (taking half the total daily dose every 12 hours) may also prevent shunting (Ulysses 2024). Patients with symptoms of ICP and elevated  bile acid levels should discontinue treatment. Symptoms improve after the thiopurine is discontinued (FDA 2024).  Mercaptopurine is approved for use as part of combination therapy for acute lymphoblastic leukemia. Data are available following use for leukemia during pregnancy; outcomes may be influenced by concomitant medications (NTP 2013; Ticku 2013).  Mercaptopurine is also used (off label) for the treatment of ulcerative colitis and Crohn disease. The risk of adverse fetal events is decreased with monotherapy. In addition, maternal use to maintain remission may improve pregnancy outcomes. Patients with inflammatory bowel disease who are on maintenance therapy with mercaptopurine monotherapy may continue treatment during pregnancy. Initiating treatment during pregnancy is not recommended. Combination therapy is also not recommended (AGA [Guillermo 2019]; Puchner 2019; Restellini 2020).  The European Society for Medical Oncology has published guidelines for diagnosis, treatment, and follow-up of cancer during pregnancy; the guidelines recommend referral to a facility with expertise in cancer during pregnancy and encourage a multidisciplinary team (obstetrician, neonatologist, oncology team). In general, if chemotherapy is indicated, it should be avoided in the first trimester and there should be a 3-week time period between the last chemotherapy dose and anticipated delivery, and chemotherapy should not be administered beyond week 33 of gestation (Peccatori 2013).  Breastfeeding Considerations   Mercaptopurine may be present in breast milk.  A case report describes maternal use of mercaptopurine 50 mg daily for the treatment of Crohn disease throughout pregnancy. One month prior to delivery, intracellular concentrations of 6-thioguanine and 6-methylmercaptopurine (6MMP) were detected in maternal blood samples obtained 2 and 4 hours after a maternal dose. A single breast milk sample was obtained 1 day after  delivery, 4 hours after the maternal mercaptopurine dose. Mercaptopurine, 6-thioguanine, and 6MMP were undetectable in the breast milk sample (Ter Liu 2019). Mercaptopurine is the active metabolite of azathioprine. Following administration of azathioprine, mercaptopurine can be detected in breast milk (Lisa 2006).  Due to the potential for serious adverse reactions in the  infant, breastfeeding is not recommended by the  during therapy and for 1 week after the last mercaptopurine dose. When used as monotherapy for the treatment of inflammatory bowel disease, mercaptopurine is considered to be compatible with breastfeeding (AGA [Guillermo 2019]; Ariel 2018). Waiting for 4 hours after the maternal dose to breastfeed may decrease potential exposure to the infant (Jasmin 2019; Juliana 2020; radha Hatfield 2019).    IMPRESSION:  IUP at 13w5d  CRL consistent with dates.  Normal NT measurement normal.  First trimester anatomy  Desires cell free DNA screening - drawn today  Ulcerative colitis    RECOMMENDATIONS:  Continue care with Dr. Garibay  Level 2 ultrasound 20 weeks  Monthly growth ultrasound in the third trimester with addition of a BPP with each growth assessment 32 weeks and beyond  She is to continue follow-up with her GI physician  Monitor for s/sx of intrahepatic cholestasis of pregnancy due to mercaptopurine use    Total time spent 55 minutes this calendar day which includes preparing to see the patient including chart review, obtaining and/or reviewing additional medical history, performing a physical exam and evaluation, documenting clinical information in the electronic medical record, independently interpreting results, counseling the patient, communicating results to the patient/family/caregiver and coordinating care.     Case discussed with patient who demonstrated understanding and agreement with plan.     Thank you for allowing me to participate in the care of this patient.   Please feel free to contact me with any questions.    Varsha Jama MD  Maternal-Fetal Medicine       Note to patient and family:  The 21st Century Cures Act makes medical notes available to patients in the interest of transparency.  However, please be advised that this is a medical document.  It is intended as a peer to peer communication.  It is written in medical language and may contain abbreviations or verbiage that are technical and unfamiliar.  It may appear blunt or direct.  Medical documents are intended to carry relevant information, facts as evident, and the clinical opinion of the practitioner.         [1]   Current Outpatient Medications:     prenatal vitamin with DHA 27-0.8-228 MG Oral Cap, Take 1 capsule by mouth daily., Disp: , Rfl:     prenatal vitamin with DHA 27-0.8-228 MG Oral Cap, Take 1 capsule by mouth daily., Disp: , Rfl:     MULTIPLE VITAMIN OR, Take by mouth daily. (Patient not taking: Reported on 3/10/2025), Disp: , Rfl:     sulfamethoxazole-trimethoprim DS (BACTRIM DS) 800-160 MG Oral Tab per tablet, Take 1 tablet by mouth 2 (two) times daily. (Patient not taking: Reported on 9/3/2024), Disp: 10 tablet, Rfl: 0    mesalamine 1.2 g Oral Tab EC, Take 2 tablets (2.4 g total) by mouth daily. (Patient not taking: Reported on 4/9/2025), Disp: , Rfl:     MERCAPTOPURINE OR, 50 mg daily. (Patient not taking: Reported on 4/9/2025), Disp: , Rfl:   [2] No Known Allergies

## 2025-04-17 ENCOUNTER — TELEPHONE (OUTPATIENT)
Dept: PERINATAL CARE | Facility: HOSPITAL | Age: 35
End: 2025-04-17

## 2025-04-17 NOTE — TELEPHONE ENCOUNTER
[facility] screening results  Reviewed by FRANCK Quach    Low Risk for Aneuploidies, triploidy and Monosomy X  Fetal Sex: male          My chart message sent  Copy of results sent for scanning into pt record

## 2025-05-05 ENCOUNTER — ROUTINE PRENATAL (OUTPATIENT)
Dept: OBGYN CLINIC | Facility: CLINIC | Age: 35
End: 2025-05-05
Payer: COMMERCIAL

## 2025-05-05 VITALS
SYSTOLIC BLOOD PRESSURE: 121 MMHG | HEART RATE: 56 BPM | DIASTOLIC BLOOD PRESSURE: 58 MMHG | BODY MASS INDEX: 23 KG/M2 | WEIGHT: 152.19 LBS

## 2025-05-05 DIAGNOSIS — Z34.92 ENCOUNTER FOR SUPERVISION OF NORMAL PREGNANCY IN SECOND TRIMESTER, UNSPECIFIED GRAVIDITY (HCC): Primary | ICD-10-CM

## 2025-05-05 DIAGNOSIS — N89.8 VAGINAL DISCHARGE: ICD-10-CM

## 2025-05-05 LAB
APPEARANCE: CLEAR
BILIRUBIN: NEGATIVE
GLUCOSE (URINE DIPSTICK): NEGATIVE MG/DL
KETONES (URINE DIPSTICK): NEGATIVE MG/DL
MULTISTIX LOT#: ABNORMAL NUMERIC
NITRITE, URINE: NEGATIVE
PH, URINE: 7 (ref 4.5–8)
PROTEIN (URINE DIPSTICK): NEGATIVE MG/DL
SPECIFIC GRAVITY: 1 (ref 1–1.03)
URINE-COLOR: YELLOW
UROBILINOGEN,SEMI-QN: 0.2 MG/DL (ref 0–1.9)

## 2025-05-05 PROCEDURE — 81002 URINALYSIS NONAUTO W/O SCOPE: CPT | Performed by: OBSTETRICS & GYNECOLOGY

## 2025-05-05 NOTE — PROGRESS NOTES
Patient c/o 3 weeks of yellow/brown thin dc.  No cramping, bright red vaginal bleeding or pain.  Spec exam shows scant yellow dc.  Cultures collected. Cervix visually closed/long. +FHT by doppler today. MFM scheduled.  RTC 4 wks.

## 2025-05-06 LAB
BV BACTERIA DNA VAG QL NAA+PROBE: NEGATIVE
C GLABRATA DNA VAG QL NAA+PROBE: NEGATIVE
C KRUSEI DNA VAG QL NAA+PROBE: NEGATIVE
CANDIDA DNA VAG QL NAA+PROBE: NEGATIVE
T VAGINALIS DNA VAG QL NAA+PROBE: NEGATIVE

## 2025-05-27 ENCOUNTER — HOSPITAL ENCOUNTER (OUTPATIENT)
Dept: PERINATAL CARE | Facility: HOSPITAL | Age: 35
Discharge: HOME | End: 2025-05-27
Attending: OBSTETRICS & GYNECOLOGY
Payer: COMMERCIAL

## 2025-05-27 VITALS
HEART RATE: 62 BPM | DIASTOLIC BLOOD PRESSURE: 66 MMHG | BODY MASS INDEX: 24 KG/M2 | WEIGHT: 155 LBS | SYSTOLIC BLOOD PRESSURE: 111 MMHG

## 2025-05-27 DIAGNOSIS — Z36.89 ENCOUNTER FOR FETAL ANATOMIC SURVEY (HCC): Primary | ICD-10-CM

## 2025-05-27 DIAGNOSIS — Z36.89 ENCOUNTER FOR FETAL ANATOMIC SURVEY (HCC): ICD-10-CM

## 2025-05-27 DIAGNOSIS — O09.522 AMA (ADVANCED MATERNAL AGE) MULTIGRAVIDA 35+, SECOND TRIMESTER (HCC): ICD-10-CM

## 2025-05-27 DIAGNOSIS — K51.00 ULCERATIVE PANCOLITIS WITHOUT COMPLICATION (HCC): ICD-10-CM

## 2025-05-27 PROCEDURE — 76811 OB US DETAILED SNGL FETUS: CPT | Performed by: OBSTETRICS & GYNECOLOGY

## 2025-05-27 NOTE — PROGRESS NOTES
Outpatient Maternal-Fetal Medicine Consultation    Dear Dr. Garibay,    Thank you for requesting ultrasound evaluation and maternal fetal medicine consultation on your patient Angela Villeda.  As you are aware she is a 35 year old female with a Strickland pregnancy.  A maternal-fetal medicine consultation was requested secondary to ulcerative colitis.  Her prenatal records and labs were reviewed.    HISTORY  OB History    Para Term  AB Living   1 0 0 0 0 0   SAB IAB Ectopic Multiple Live Births   0 0 0 0 0     # 1 - Date: None, Sex: None, Weight: None, GA: None, Type: None, Apgar1: None, Apgar5: None, Living: None, Birth Comments: None    Past Medical History  The patient  has a past medical history of History of anemia, History of frequent urinary tract infections, Thyroid nodule, and Ulcerative colitis, unspecified (2005).    Past Surgical History  The patient  has a past surgical history that includes wisdom teeth removed () and other surgical history.    Family History  The patient She indicated that her mother is alive. She indicated that her father is alive. She indicated that her sister is alive. She indicated that the status of her maternal grandmother is unknown. She indicated that the status of her maternal grandfather is unknown. She indicated that the status of her paternal grandmother is unknown.      Medications: Medications - Current[1]  Allergies: Allergies[2]      PHYSICAL EXAMINATION:  /66   Pulse 62   Wt 155 lb (70.3 kg)   LMP 2025   BMI 23.57 kg/m²    General: alert and oriented,no acute distress  Abdomen: gravid, soft, non-tender  Extremities: non-tender, no edema        DISCUSSION  During her visit we discussed and reviewed the following issues:      INFLAMMATORY BOWEL DISEASE IN PREGNANCY     She has ulcerative colitis that was diagnosed when she was age 12-13.  Her doctor is Dr. Card through [facility].  She also reports her colonoscopy in the  fall 2024 was negative for any inflammation.  She is on mesalamine and mercaptopurine 25 mg twice daily.    Her GI physician had her stay on this medication regimen knowing that she was planning pregnancy.    INTRODUCTION  Women with inflammatory bowel disease (IBD) are at an increased risk for worse obstetric and medical outcomes as compared with the age-matched general population.     EFFECT OF PREGNANCY ON IBD  For patients with quiescent IBD at conception, the course of IBD is approximately the same as in nonpregnant patients. Patients with Crohn disease have a similar disease course during pregnancy and the postpartum as nonpregnant women with IBD. However, for patients with ulcerative colitis, there is greater disease activity during pregnancy and the postpartum than the nonpregnant patient. The reason for this is unclear and may be associated with smoking cessation in the Crohn patient and cytokines secreted by the placenta.      Approximately one-third of women with quiescent disease at conception relapse during the pregnancy. Relapses are more common during the first trimester. However, remission achieved during pregnancy is likely to be sustained throughout the remainder of the pregnancy.     In contrast, approximately 70 percent of patients with active disease at conception are likely to have continued or worsening symptoms during pregnancy.     EFFECT OF IBD ON PREGNANCY   Pregnant women with IBD may be at increased risk for antepartum hemorrhage, low birth weight infants, and premature delivery. However, the risk of congenital abnormalities does not appear to be increased.     The degree of disease activity may account, in part, for the poor pregnancy-related outcomes.  The risk of poor pregnancy outcomes in women with IBD is greatest in those who have active disease at the time of conception, and in whom remission may be difficult to achieve during pregnancy.     MEDICATIONS DURING PREGNANCY AND  LACTATION  Introduction  The choice of antiinflammatory and immunosuppressive used during pregnancy and lactation should be based upon their relative safety and indications as well as the risk of relapse of inflammatory bowel disease (IBD) if the medications are discontinued.      Active IBD itself is associated with poor pregnancy outcomes, and therefore the risks associated with discontinuing some medications (eg, azathioprine, anti-tumor necrosis factor [anti-TNF] agents) are higher than the known risks of the medications themselves.     NUTRITION  Consultation with a nutritionist should be considered to offer advice on eating a well-balanced, healthy diet.  Folate supplementation (2 mg daily) should be recommended in patients with IBD on low residue diets, with ileal involvement, and patients on medications that interfere with folic acid metabolism (eg, sulfasalazine).  Iron and B12 levels should therefore be checked in the first trimester and supplementation should be provided as needed     MODE OF DELIVERY  Patients with active perineal disease or rectal involvement with Crohn disease should undergo  delivery in order to avoid perineal trauma from vaginal birth that can trigger or worsen existing perineal disease. The mode of delivery in all other patients with inflammatory bowel disease should be dictated by obstetric necessity. In patients with an ileoanal anastomosis or J pouch, many advocate planned  delivery as well, though data suggest no long-term worsening of pouch function.\Vaginal delivery can be attempted in women with a colostomy, ileostomy, or continent ileostomy and has not been associated with an increased risk of ostomy complications.    Mercaptopurine is approved for use as part of combination maintenance therapy for acute lymphoblastic leukemia. Product labeling recommends patients who could become pregnant use effective contraception during mercaptopurine treatment and for 6  months after the last mercaptopurine dose. Patients with partners who could become pregnant should be advised to use effective contraception during therapy and for 3 months after the last dose of mercaptopurine.  Mercaptopurine is also used (off label) for the treatment of ulcerative colitis and Crohn disease; monotherapy use for these indications may be continued in patients planning a pregnancy (Ariel 2018). Mercaptopurine does not decrease fertility in patients with inflammatory bowel disease (AGA [Guillermo 2019]; Ariel 2018).  Pregnancy Considerations   The 6-methylmercaptopurine (6-MMP) and 6-thioguanine (6-TGN) metabolites can be detected in cord blood following maternal use during pregnancy. Infant serum concentrations were undetectable by 6 weeks of age in one study (Kirt ).  Mercaptopurine may be associated with adverse fetal outcomes. An increased risk of miscarriage has been noted with mercaptopurine administration during the first trimester; adverse events, including miscarriage and stillbirth, have also been noted with second and third trimester use. An increased risk of stillbirth,  birth, and infants large for gestational age may be observed with maternal use of thiopurines (Thibodeaux 202).  Intrahepatic cholestasis of pregnancy (ICP) has been associated with thiopurine use. In one study, patients with a metabolite ratio of 6-MMP to 6-TGN >11 (referred to as thiopurine shunting) was associated with the risk of ICP (Ang ). The pharmacokinetic properties of thiopurines may be altered by pregnancy. A decrease in 6-TGN and an increase in 6-MMP was observed in the second trimester (Kirt ). Closely monitor liver transaminases and thiopurine metabolites during pregnancy. Split dosing (taking half the total daily dose every 12 hours) may also prevent shunting (Courtland 2024). Patients with symptoms of ICP and elevated bile acid levels should discontinue treatment. Symptoms  improve after the thiopurine is discontinued (FDA 2024).  Mercaptopurine is approved for use as part of combination therapy for acute lymphoblastic leukemia. Data are available following use for leukemia during pregnancy; outcomes may be influenced by concomitant medications (NTP 2013; Ticku 2013).  Mercaptopurine is also used (off label) for the treatment of ulcerative colitis and Crohn disease. The risk of adverse fetal events is decreased with monotherapy. In addition, maternal use to maintain remission may improve pregnancy outcomes. Patients with inflammatory bowel disease who are on maintenance therapy with mercaptopurine monotherapy may continue treatment during pregnancy. Initiating treatment during pregnancy is not recommended. Combination therapy is also not recommended (AGA [Guillermo 2019]; Puchner 2019; Restellini 2020).  The European Society for Medical Oncology has published guidelines for diagnosis, treatment, and follow-up of cancer during pregnancy; the guidelines recommend referral to a facility with expertise in cancer during pregnancy and encourage a multidisciplinary team (obstetrician, neonatologist, oncology team). In general, if chemotherapy is indicated, it should be avoided in the first trimester and there should be a 3-week time period between the last chemotherapy dose and anticipated delivery, and chemotherapy should not be administered beyond week 33 of gestation (Peccatori 2013).  Breastfeeding Considerations   Mercaptopurine may be present in breast milk.  A case report describes maternal use of mercaptopurine 50 mg daily for the treatment of Crohn disease throughout pregnancy. One month prior to delivery, intracellular concentrations of 6-thioguanine and 6-methylmercaptopurine (6MMP) were detected in maternal blood samples obtained 2 and 4 hours after a maternal dose. A single breast milk sample was obtained 1 day after delivery, 4 hours after the maternal mercaptopurine dose.  Mercaptopurine, 6-thioguanine, and 6MMP were undetectable in the breast milk sample (Ter Liu 2019). Mercaptopurine is the active metabolite of azathioprine. Following administration of azathioprine, mercaptopurine can be detected in breast milk (Lisa 2006).  Due to the potential for serious adverse reactions in the  infant, breastfeeding is not recommended by the  during therapy and for 1 week after the last mercaptopurine dose. When used as monotherapy for the treatment of inflammatory bowel disease, mercaptopurine is considered to be compatible with breastfeeding (AGA [Guillermo 2019]; Ariel 2018). Waiting for 4 hours after the maternal dose to breastfeed may decrease potential exposure to the infant (Baileyer 2019; Restellini 2020; ter Liu 2019).      OB ULTRASOUND REPORT   See imaging tab for complete ultrasound report or in PACS    Single IUP in breech presentation.    Placenta is anterior.   A 3 vessel cord is noted.  Insertion site: normal insertion  Cardiac activity is present at 136 bpm   g ( 0 lb 14 oz);   MVP is 5.8 cm  Cervix  measured  36 mm transabdominally   Uterus and adnexa appeared normal today on ultrasound      Fetal Anatomy:  Visualized with normal appearance: head, face, spine, neck, skin, chest, abdominal wall, gastrointestinal tract, kidneys, bladder, extremities.   Brain: Visualized and normal appearances: brain parenchyma, cerebral ventricles, choroid plexus, Cisterna Magna, midline falx, cerebellum, cerebellar lobes, posterior fossa, vermis, cavum septi pellucidi.  Face: eyes normal, profile normal, nose normal, lip normal, palate normal.  Heart: visualized and normal appearance: 3 vessel view, four-chamber, left outflow tract, right outflow tract, arches.      Genetic Sonogram:  Nuchal fold normal.  Pylelectasis absent.  No hyperechogenic bowel.  Echogenic intracardiac foci absent. Nasal bone present. Choroid plexus cyst absent.      Summary of  Ultrasound findings:  This is a Strickland pregnancy    The fetal measurements are consistent with established EDC. No gross ultrasound evidence of structural abnormalities are seen today. No minor markers for aneuploidy are seen. The patient understands that ultrasound cannot rule out all structural and chromosomal abnormalities.       IMPRESSION:   1. IUP @  20w3d  2. Scan consistent with dates  3. No fetal structural abnormalities seen  4. Ulcerative colitis    RECOMMENDATIONS:     Monthly growth ultrasound with addition of a BPP with each growth assessment 32 weeks and beyond  She is to continue follow-up with her GI physician  Monitor for s/sx of intrahepatic cholestasis of pregnancy due to mercaptopurine use    Total time spent 35 minutes this calendar day which includes preparing to see the patient including chart review, obtaining and/or reviewing additional medical history, performing a physical exam and evaluation, documenting clinical information in the electronic medical record, independently interpreting results, counseling the patient, communicating results to the patient/family/caregiver and coordinating care.     Case discussed with patient who demonstrated understanding and agreement with plan.     Thank you for allowing me to participate in the care of this patient.  Please feel free to contact me with any questions.    Kang Centeno D.O.  Maternal Fetal Medicine          Note to patient and family:  The 21st Century Cures Act makes medical notes available to patients in the interest of transparency.  However, please be advised that this is a medical document.  It is intended as a peer to peer communication.  It is written in medical language and may contain abbreviations or verbiage that are technical and unfamiliar.  It may appear blunt or direct.  Medical documents are intended to carry relevant information, facts as evident, and the clinical opinion of the practitioner.         [1]   Current  Outpatient Medications:     prenatal vitamin with DHA 27-0.8-228 MG Oral Cap, Take 1 capsule by mouth daily., Disp: , Rfl:     prenatal vitamin with DHA 27-0.8-228 MG Oral Cap, Take 1 capsule by mouth daily., Disp: , Rfl:     MULTIPLE VITAMIN OR, Take by mouth daily. (Patient not taking: Reported on 3/10/2025), Disp: , Rfl:     sulfamethoxazole-trimethoprim DS (BACTRIM DS) 800-160 MG Oral Tab per tablet, Take 1 tablet by mouth 2 (two) times daily. (Patient not taking: Reported on 9/3/2024), Disp: 10 tablet, Rfl: 0    mesalamine 1.2 g Oral Tab EC, Take 2 tablets (2.4 g total) by mouth daily. (Patient not taking: Reported on 5/5/2025), Disp: , Rfl:     MERCAPTOPURINE OR, 50 mg daily. (Patient not taking: Reported on 5/5/2025), Disp: , Rfl:   [2] No Known Allergies

## 2025-06-05 ENCOUNTER — ROUTINE PRENATAL (OUTPATIENT)
Dept: OBGYN CLINIC | Facility: CLINIC | Age: 35
End: 2025-06-05
Payer: COMMERCIAL

## 2025-06-05 VITALS
WEIGHT: 156 LBS | HEART RATE: 71 BPM | BODY MASS INDEX: 24 KG/M2 | SYSTOLIC BLOOD PRESSURE: 121 MMHG | DIASTOLIC BLOOD PRESSURE: 70 MMHG

## 2025-06-05 DIAGNOSIS — Z34.92 ENCOUNTER FOR SUPERVISION OF NORMAL PREGNANCY IN SECOND TRIMESTER, UNSPECIFIED GRAVIDITY (HCC): Primary | ICD-10-CM

## 2025-06-05 PROBLEM — O09.522 AMA (ADVANCED MATERNAL AGE) MULTIGRAVIDA 35+, SECOND TRIMESTER (HCC): Status: RESOLVED | Noted: 2025-05-27 | Resolved: 2025-06-05

## 2025-06-05 LAB
BILIRUBIN: NEGATIVE
GLUCOSE (URINE DIPSTICK): NEGATIVE MG/DL
KETONES (URINE DIPSTICK): NEGATIVE MG/DL
LEUKOCYTES: NEGATIVE
MULTISTIX LOT#: 57 NUMERIC
NITRITE, URINE: NEGATIVE
OCCULT BLOOD: NEGATIVE
PH, URINE: 7 (ref 4.5–8)
PROTEIN (URINE DIPSTICK): NEGATIVE MG/DL
SPECIFIC GRAVITY: 1.01 (ref 1–1.03)
UROBILINOGEN,SEMI-QN: 0.2 MG/DL (ref 0–1.9)

## 2025-06-05 PROCEDURE — 81002 URINALYSIS NONAUTO W/O SCOPE: CPT | Performed by: OBSTETRICS & GYNECOLOGY

## 2025-07-08 ENCOUNTER — ROUTINE PRENATAL (OUTPATIENT)
Dept: OBGYN CLINIC | Facility: CLINIC | Age: 35
End: 2025-07-08
Payer: COMMERCIAL

## 2025-07-08 VITALS
DIASTOLIC BLOOD PRESSURE: 67 MMHG | HEART RATE: 66 BPM | BODY MASS INDEX: 24 KG/M2 | SYSTOLIC BLOOD PRESSURE: 103 MMHG | WEIGHT: 158.81 LBS

## 2025-07-08 DIAGNOSIS — Z34.92 ENCOUNTER FOR SUPERVISION OF NORMAL PREGNANCY IN SECOND TRIMESTER, UNSPECIFIED GRAVIDITY (HCC): Primary | ICD-10-CM

## 2025-07-08 LAB
APPEARANCE: CLEAR
BILIRUBIN: NEGATIVE
GLUCOSE (URINE DIPSTICK): NEGATIVE MG/DL
KETONES (URINE DIPSTICK): NEGATIVE MG/DL
LEUKOCYTES: NEGATIVE
MULTISTIX LOT#: NORMAL NUMERIC
NITRITE, URINE: NEGATIVE
OCCULT BLOOD: NEGATIVE
PH, URINE: 7 (ref 4.5–8)
PROTEIN (URINE DIPSTICK): NEGATIVE MG/DL
SPECIFIC GRAVITY: 1.01 (ref 1–1.03)
URINE-COLOR: YELLOW
UROBILINOGEN,SEMI-QN: 0.2 MG/DL (ref 0–1.9)

## 2025-07-08 PROCEDURE — 81002 URINALYSIS NONAUTO W/O SCOPE: CPT | Performed by: OBSTETRICS & GYNECOLOGY

## 2025-07-21 ENCOUNTER — LAB ENCOUNTER (OUTPATIENT)
Dept: LAB | Facility: HOSPITAL | Age: 35
End: 2025-07-21
Attending: OBSTETRICS & GYNECOLOGY
Payer: COMMERCIAL

## 2025-07-21 ENCOUNTER — HOSPITAL ENCOUNTER (OUTPATIENT)
Dept: PERINATAL CARE | Facility: HOSPITAL | Age: 35
Discharge: HOME | End: 2025-07-21
Attending: OBSTETRICS & GYNECOLOGY
Payer: COMMERCIAL

## 2025-07-21 ENCOUNTER — HOSPITAL ENCOUNTER (OUTPATIENT)
Dept: PERINATAL CARE | Facility: HOSPITAL | Age: 35
End: 2025-07-21
Attending: OBSTETRICS & GYNECOLOGY
Payer: COMMERCIAL

## 2025-07-21 ENCOUNTER — ROUTINE PRENATAL (OUTPATIENT)
Dept: OBGYN CLINIC | Facility: CLINIC | Age: 35
End: 2025-07-21
Payer: COMMERCIAL

## 2025-07-21 VITALS
SYSTOLIC BLOOD PRESSURE: 124 MMHG | WEIGHT: 158 LBS | HEART RATE: 77 BPM | DIASTOLIC BLOOD PRESSURE: 64 MMHG | BODY MASS INDEX: 24 KG/M2

## 2025-07-21 VITALS
DIASTOLIC BLOOD PRESSURE: 69 MMHG | SYSTOLIC BLOOD PRESSURE: 109 MMHG | WEIGHT: 161 LBS | BODY MASS INDEX: 24 KG/M2 | HEART RATE: 70 BPM

## 2025-07-21 DIAGNOSIS — K51.00 ULCERATIVE PANCOLITIS WITHOUT COMPLICATION (HCC): ICD-10-CM

## 2025-07-21 DIAGNOSIS — Z34.03 ENCOUNTER FOR SUPERVISION OF NORMAL FIRST PREGNANCY IN THIRD TRIMESTER (HCC): ICD-10-CM

## 2025-07-21 DIAGNOSIS — Z34.03 ENCOUNTER FOR SUPERVISION OF NORMAL FIRST PREGNANCY IN THIRD TRIMESTER (HCC): Primary | ICD-10-CM

## 2025-07-21 DIAGNOSIS — K51.00 ULCERATIVE PANCOLITIS WITHOUT COMPLICATION (HCC): Primary | ICD-10-CM

## 2025-07-21 DIAGNOSIS — O09.93 HIGH-RISK PREGNANCY, THIRD TRIMESTER (HCC): ICD-10-CM

## 2025-07-21 LAB
APPEARANCE: CLEAR
DEPRECATED RDW RBC AUTO: 46 FL (ref 35.1–46.3)
ERYTHROCYTE [DISTWIDTH] IN BLOOD BY AUTOMATED COUNT: 13 % (ref 11–15)
GLUCOSE (URINE DIPSTICK): NEGATIVE MG/DL
GLUCOSE 1H P GLC SERPL-MCNC: 122 MG/DL (ref 70–130)
HCT VFR BLD AUTO: 32.2 % (ref 35–48)
HGB BLD-MCNC: 11.3 G/DL (ref 12–16)
KETONES (URINE DIPSTICK): NEGATIVE MG/DL
MCH RBC QN AUTO: 34.1 PG (ref 26–34)
MCHC RBC AUTO-ENTMCNC: 35.1 G/DL (ref 31–37)
MCV RBC AUTO: 97.3 FL (ref 80–100)
MULTISTIX LOT#: NORMAL NUMERIC
NITRITE, URINE: NEGATIVE
PLATELET # BLD AUTO: 270 10(3)UL (ref 150–450)
PROTEIN (URINE DIPSTICK): NEGATIVE MG/DL
RBC # BLD AUTO: 3.31 X10(6)UL (ref 3.8–5.3)
WBC # BLD AUTO: 8 X10(3) UL (ref 4–11)

## 2025-07-21 PROCEDURE — 90715 TDAP VACCINE 7 YRS/> IM: CPT | Performed by: OBSTETRICS & GYNECOLOGY

## 2025-07-21 PROCEDURE — 76816 OB US FOLLOW-UP PER FETUS: CPT | Performed by: OBSTETRICS & GYNECOLOGY

## 2025-07-21 PROCEDURE — 85027 COMPLETE CBC AUTOMATED: CPT

## 2025-07-21 PROCEDURE — 90471 IMMUNIZATION ADMIN: CPT | Performed by: OBSTETRICS & GYNECOLOGY

## 2025-07-21 PROCEDURE — 82950 GLUCOSE TEST: CPT

## 2025-07-21 PROCEDURE — 81002 URINALYSIS NONAUTO W/O SCOPE: CPT | Performed by: OBSTETRICS & GYNECOLOGY

## 2025-07-21 PROCEDURE — 36415 COLL VENOUS BLD VENIPUNCTURE: CPT

## 2025-07-21 NOTE — PROGRESS NOTES
Outpatient Maternal-Fetal Medicine Consultation    Dear Dr. Garibay,    Thank you for requesting ultrasound evaluation and maternal fetal medicine consultation on your patient Angela Villeda.  As you are aware she is a 35 year old female with a Strickland pregnancy.  A maternal-fetal medicine f/u is today. .  Her prenatal records and labs were reviewed.    Ulcerative colitis  in remission.  No symptoms.    HISTORY  OB History    Para Term  AB Living   1 0 0 0 0 0   SAB IAB Ectopic Multiple Live Births   0 0 0 0 0     # 1 - Date: None, Sex: None, Weight: None, GA: None, Type: None, Apgar1: None, Apgar5: None, Living: None, Birth Comments: None    Past Medical History  The patient  has a past medical history of History of anemia, History of frequent urinary tract infections, Thyroid nodule, and Ulcerative colitis, unspecified (2005).    Past Surgical History  The patient  has a past surgical history that includes wisdom teeth removed () and other surgical history.        Medications:   Current Outpatient Medications:     prenatal vitamin with DHA 27-0.8-228 MG Oral Cap, Take 1 capsule by mouth daily., Disp: , Rfl:     mesalamine 1.2 g Oral Tab EC, Take 2 tablets (2.4 g total) by mouth daily., Disp: , Rfl:     MERCAPTOPURINE OR, 50 mg daily., Disp: , Rfl:   Allergies: Allergies[1]      PHYSICAL EXAMINATION:  /64   Pulse 77   Wt 158 lb (71.7 kg)   LMP 2025   BMI 24.02 kg/m²    General: alert and oriented,no acute distress  Abdomen: gravid, soft, non-tender          DISCUSSION  During her visit we discussed and reviewed the following issues:      INFLAMMATORY BOWEL DISEASE IN PREGNANCY     She has ulcerative colitis that was diagnosed when she was age 12-13.  Her doctor is Dr. Card through [facility].  She also reports her colonoscopy in the 2024 was negative for any inflammation.  She is on mesalamine and mercaptopurine 25 mg twice daily.    Her GI physician had her  stay on this medication regimen knowing that she was planning pregnancy.    INTRODUCTION  Women with inflammatory bowel disease (IBD) are at an increased risk for worse obstetric and medical outcomes as compared with the age-matched general population.     EFFECT OF PREGNANCY ON IBD  For patients with quiescent IBD at conception, the course of IBD is approximately the same as in nonpregnant patients. Patients with Crohn disease have a similar disease course during pregnancy and the postpartum as nonpregnant women with IBD. However, for patients with ulcerative colitis, there is greater disease activity during pregnancy and the postpartum than the nonpregnant patient. The reason for this is unclear and may be associated with smoking cessation in the Crohn patient and cytokines secreted by the placenta.      Approximately one-third of women with quiescent disease at conception relapse during the pregnancy. Relapses are more common during the first trimester. However, remission achieved during pregnancy is likely to be sustained throughout the remainder of the pregnancy.     In contrast, approximately 70 percent of patients with active disease at conception are likely to have continued or worsening symptoms during pregnancy.     EFFECT OF IBD ON PREGNANCY   Pregnant women with IBD may be at increased risk for antepartum hemorrhage, low birth weight infants, and premature delivery. However, the risk of congenital abnormalities does not appear to be increased.     The degree of disease activity may account, in part, for the poor pregnancy-related outcomes.  The risk of poor pregnancy outcomes in women with IBD is greatest in those who have active disease at the time of conception, and in whom remission may be difficult to achieve during pregnancy.     MEDICATIONS DURING PREGNANCY AND LACTATION  Introduction  The choice of antiinflammatory and immunosuppressive used during pregnancy and lactation should be based upon  their relative safety and indications as well as the risk of relapse of inflammatory bowel disease (IBD) if the medications are discontinued.      Active IBD itself is associated with poor pregnancy outcomes, and therefore the risks associated with discontinuing some medications (eg, azathioprine, anti-tumor necrosis factor [anti-TNF] agents) are higher than the known risks of the medications themselves.     NUTRITION  Consultation with a nutritionist should be considered to offer advice on eating a well-balanced, healthy diet.  Folate supplementation (2 mg daily) should be recommended in patients with IBD on low residue diets, with ileal involvement, and patients on medications that interfere with folic acid metabolism (eg, sulfasalazine).  Iron and B12 levels should therefore be checked in the first trimester and supplementation should be provided as needed     MODE OF DELIVERY  Patients with active perineal disease or rectal involvement with Crohn disease should undergo  delivery in order to avoid perineal trauma from vaginal birth that can trigger or worsen existing perineal disease. The mode of delivery in all other patients with inflammatory bowel disease should be dictated by obstetric necessity. In patients with an ileoanal anastomosis or J pouch, many advocate planned  delivery as well, though data suggest no long-term worsening of pouch function.\Vaginal delivery can be attempted in women with a colostomy, ileostomy, or continent ileostomy and has not been associated with an increased risk of ostomy complications.    Mercaptopurine is approved for use as part of combination maintenance therapy for acute lymphoblastic leukemia. Product labeling recommends patients who could become pregnant use effective contraception during mercaptopurine treatment and for 6 months after the last mercaptopurine dose. Patients with partners who could become pregnant should be advised to use effective  contraception during therapy and for 3 months after the last dose of mercaptopurine.  Mercaptopurine is also used (off label) for the treatment of ulcerative colitis and Crohn disease; monotherapy use for these indications may be continued in patients planning a pregnancy (Ariel 2018). Mercaptopurine does not decrease fertility in patients with inflammatory bowel disease (AGA [Guillermo 2019]; Ariel 2018).  Pregnancy Considerations   The 6-methylmercaptopurine (6-MMP) and 6-thioguanine (6-TGN) metabolites can be detected in cord blood following maternal use during pregnancy. Infant serum concentrations were undetectable by 6 weeks of age in one study ([place] ).  Mercaptopurine may be associated with adverse fetal outcomes. An increased risk of miscarriage has been noted with mercaptopurine administration during the first trimester; adverse events, including miscarriage and stillbirth, have also been noted with second and third trimester use. An increased risk of stillbirth,  birth, and infants large for gestational age may be observed with maternal use of thiopurines (Thibodeaux 202).  Intrahepatic cholestasis of pregnancy (ICP) has been associated with thiopurine use. In one study, patients with a metabolite ratio of 6-MMP to 6-TGN >11 (referred to as thiopurine shunting) was associated with the risk of ICP ([place] ). The pharmacokinetic properties of thiopurines may be altered by pregnancy. A decrease in 6-TGN and an increase in 6-MMP was observed in the second trimester ([place] ). Closely monitor liver transaminases and thiopurine metabolites during pregnancy. Split dosing (taking half the total daily dose every 12 hours) may also prevent shunting ([place] 202). Patients with symptoms of ICP and elevated bile acid levels should discontinue treatment. Symptoms improve after the thiopurine is discontinued (FDA 2024).  Mercaptopurine is approved for use as part of combination therapy for  acute lymphoblastic leukemia. Data are available following use for leukemia during pregnancy; outcomes may be influenced by concomitant medications (NTP 2013; Ticku 2013).  Mercaptopurine is also used (off label) for the treatment of ulcerative colitis and Crohn disease. The risk of adverse fetal events is decreased with monotherapy. In addition, maternal use to maintain remission may improve pregnancy outcomes. Patients with inflammatory bowel disease who are on maintenance therapy with mercaptopurine monotherapy may continue treatment during pregnancy. Initiating treatment during pregnancy is not recommended. Combination therapy is also not recommended (AGA [Guillermo 2019]; Baileyer 2019; Restellini 2020).  The European Society for Medical Oncology has published guidelines for diagnosis, treatment, and follow-up of cancer during pregnancy; the guidelines recommend referral to a facility with expertise in cancer during pregnancy and encourage a multidisciplinary team (obstetrician, neonatologist, oncology team). In general, if chemotherapy is indicated, it should be avoided in the first trimester and there should be a 3-week time period between the last chemotherapy dose and anticipated delivery, and chemotherapy should not be administered beyond week 33 of gestation (Peccatori 2013).  Breastfeeding Considerations   Mercaptopurine may be present in breast milk.  A case report describes maternal use of mercaptopurine 50 mg daily for the treatment of Crohn disease throughout pregnancy. One month prior to delivery, intracellular concentrations of 6-thioguanine and 6-methylmercaptopurine (6MMP) were detected in maternal blood samples obtained 2 and 4 hours after a maternal dose. A single breast milk sample was obtained 1 day after delivery, 4 hours after the maternal mercaptopurine dose. Mercaptopurine, 6-thioguanine, and 6MMP were undetectable in the breast milk sample (Ter Liu 2019). Mercaptopurine is the active  metabolite of azathioprine. Following administration of azathioprine, mercaptopurine can be detected in breast milk (Lisa 2006).  Due to the potential for serious adverse reactions in the  infant, breastfeeding is not recommended by the  during therapy and for 1 week after the last mercaptopurine dose. When used as monotherapy for the treatment of inflammatory bowel disease, mercaptopurine is considered to be compatible with breastfeeding (AGA [Guillermo 2019]; Ariel 2018). Waiting for 4 hours after the maternal dose to breastfeed may decrease potential exposure to the infant (Jasmin 2019; Alonzoi 2020; radha Hatfield 2019).    Signs and symptoms of preeclampsia were reviewed.      OB ULTRASOUND REPORT   See imaging tab for complete ultrasound report or in PACS    Ultrasound Findings:    Single IUP in cephalic presentation.    Placenta is anterior.   A 3 vessel cord is noted. Normal cord insertion  Cardiac activity is present at 141 bpm  EFW 1205 g ( 2 lb 11 oz); 52%.    MVP is 3.7 cm . PETER 12.7 cm    The fetal measurements are consistent with established TOMA. No gross ultrasound evidence of structural abnormalities are seen today. The patient understands that ultrasound cannot rule out all structural and chromosomal abnormalities.       IMPRESSION:   1. IUP @  28w2d   2. Scan consistent with dates  3. No fetal structural abnormalities seen  4. Ulcerative colitis    RECOMMENDATIONS:     Monthly growth ultrasound with addition of a BPP with each growth assessment 32 weeks and beyond  She is to continue follow-up with her GI physician  Monitor for s/sx of intrahepatic cholestasis of pregnancy due to mercaptopurine use    Total time spent 20 minutes this calendar day which includes preparing to see the patient including chart review, obtaining and/or reviewing additional medical history, performing a physical exam and evaluation, documenting clinical information in the electronic medical  record, independently interpreting results, counseling the patient, communicating results to the patient/family/caregiver and coordinating care.     Case discussed with patient who demonstrated understanding and agreement with plan.     Thank you for allowing me to participate in the care of this patient.  Please feel free to contact me with any questions.    Giuliana Cifuentes MD   Maternal Fetal Medicine          Note to patient and family:  The 21st Century Cures Act makes medical notes available to patients in the interest of transparency.  However, please be advised that this is a medical document.  It is intended as a peer to peer communication.  It is written in medical language and may contain abbreviations or verbiage that are technical and unfamiliar.  It may appear blunt or direct.  Medical documents are intended to carry relevant information, facts as evident, and the clinical opinion of the practitioner.         [1] No Known Allergies

## 2025-08-06 ENCOUNTER — ROUTINE PRENATAL (OUTPATIENT)
Dept: OBGYN CLINIC | Facility: CLINIC | Age: 35
End: 2025-08-06

## 2025-08-06 ENCOUNTER — LAB ENCOUNTER (OUTPATIENT)
Dept: LAB | Facility: HOSPITAL | Age: 35
End: 2025-08-06
Attending: OBSTETRICS & GYNECOLOGY

## 2025-08-06 VITALS
HEART RATE: 66 BPM | DIASTOLIC BLOOD PRESSURE: 66 MMHG | WEIGHT: 163.19 LBS | SYSTOLIC BLOOD PRESSURE: 107 MMHG | BODY MASS INDEX: 25 KG/M2

## 2025-08-06 DIAGNOSIS — Z34.93 ENCOUNTER FOR SUPERVISION OF NORMAL PREGNANCY IN THIRD TRIMESTER, UNSPECIFIED GRAVIDITY (HCC): ICD-10-CM

## 2025-08-06 DIAGNOSIS — Z34.93 ENCOUNTER FOR SUPERVISION OF NORMAL PREGNANCY IN THIRD TRIMESTER, UNSPECIFIED GRAVIDITY (HCC): Primary | ICD-10-CM

## 2025-08-06 LAB
BILIRUBIN: NEGATIVE
GLUCOSE (URINE DIPSTICK): NEGATIVE MG/DL
KETONES (URINE DIPSTICK): NEGATIVE MG/DL
MULTISTIX LOT#: ABNORMAL NUMERIC
NITRITE, URINE: NEGATIVE
OCCULT BLOOD: NEGATIVE
PH, URINE: 7.5 (ref 4.5–8)
PROTEIN (URINE DIPSTICK): NEGATIVE MG/DL
SPECIFIC GRAVITY: 1 (ref 1–1.03)
T PALLIDUM AB SER QL IA: NONREACTIVE
UROBILINOGEN,SEMI-QN: 0.2 MG/DL (ref 0–1.9)

## 2025-08-06 PROCEDURE — 87389 HIV-1 AG W/HIV-1&-2 AB AG IA: CPT

## 2025-08-06 PROCEDURE — 81002 URINALYSIS NONAUTO W/O SCOPE: CPT | Performed by: OBSTETRICS & GYNECOLOGY

## 2025-08-06 PROCEDURE — 36415 COLL VENOUS BLD VENIPUNCTURE: CPT

## 2025-08-06 PROCEDURE — 86780 TREPONEMA PALLIDUM: CPT

## 2025-08-18 ENCOUNTER — HOSPITAL ENCOUNTER (OUTPATIENT)
Dept: PERINATAL CARE | Facility: HOSPITAL | Age: 35
End: 2025-08-18
Attending: OBSTETRICS & GYNECOLOGY

## 2025-08-18 ENCOUNTER — HOSPITAL ENCOUNTER (OUTPATIENT)
Dept: PERINATAL CARE | Facility: HOSPITAL | Age: 35
Discharge: HOME | End: 2025-08-18
Attending: OBSTETRICS & GYNECOLOGY

## 2025-08-18 ENCOUNTER — ROUTINE PRENATAL (OUTPATIENT)
Dept: OBGYN CLINIC | Facility: CLINIC | Age: 35
End: 2025-08-18

## 2025-08-18 VITALS
HEART RATE: 79 BPM | BODY MASS INDEX: 25 KG/M2 | DIASTOLIC BLOOD PRESSURE: 64 MMHG | WEIGHT: 163 LBS | SYSTOLIC BLOOD PRESSURE: 103 MMHG

## 2025-08-18 VITALS
SYSTOLIC BLOOD PRESSURE: 115 MMHG | DIASTOLIC BLOOD PRESSURE: 69 MMHG | HEART RATE: 79 BPM | WEIGHT: 163.38 LBS | BODY MASS INDEX: 25 KG/M2

## 2025-08-18 DIAGNOSIS — K51.00 ULCERATIVE PANCOLITIS WITHOUT COMPLICATION (HCC): ICD-10-CM

## 2025-08-18 DIAGNOSIS — Z34.93 ENCOUNTER FOR SUPERVISION OF NORMAL PREGNANCY IN THIRD TRIMESTER, UNSPECIFIED GRAVIDITY (HCC): Primary | ICD-10-CM

## 2025-08-18 DIAGNOSIS — O09.613: Primary | ICD-10-CM

## 2025-08-18 DIAGNOSIS — O09.513 PRIMIGRAVIDA OF ADVANCED MATERNAL AGE IN THIRD TRIMESTER (HCC): ICD-10-CM

## 2025-08-18 LAB
BILIRUBIN: NEGATIVE
GLUCOSE (URINE DIPSTICK): NEGATIVE MG/DL
KETONES (URINE DIPSTICK): NEGATIVE MG/DL
LEUKOCYTES: NEGATIVE
MULTISTIX LOT#: NORMAL NUMERIC
NITRITE, URINE: NEGATIVE
OCCULT BLOOD: NEGATIVE
PH, URINE: 7 (ref 4.5–8)
SPECIFIC GRAVITY: 1.01 (ref 1–1.03)
URINE-COLOR: YELLOW
UROBILINOGEN,SEMI-QN: 0.2 MG/DL (ref 0–1.9)

## 2025-08-18 PROCEDURE — 81002 URINALYSIS NONAUTO W/O SCOPE: CPT | Performed by: OBSTETRICS & GYNECOLOGY

## 2025-08-18 PROCEDURE — 76816 OB US FOLLOW-UP PER FETUS: CPT | Performed by: OBSTETRICS & GYNECOLOGY

## 2025-08-18 PROCEDURE — 76819 FETAL BIOPHYS PROFIL W/O NST: CPT

## (undated) NOTE — LETTER
VACCINE ADMINISTRATION RECORD  PARENT / GUARDIAN APPROVAL  Date: 2025  Vaccine administered to: Angela Villeda     : 3/24/1990    MRN: [id number]    A copy of the appropriate Centers for Disease Control and Prevention Vaccine Information statement has been provided. I have read or have had explained the information about the diseases and the vaccines listed below. There was an opportunity to ask questions and any questions were answered satisfactorily. I believe that I understand the benefits and risks of the vaccine cited and ask that the vaccine(s) listed below be given to me or to the person named above (for whom I am authorized to make this request).    VACCINES ADMINISTERED:  TDAP    I have read and hereby agree to be bound by the terms of this agreement as stated above. My signature is valid until revoked by me in writing.  This document is signed by SELF, relationship: SELF on 2025.:                                                                                                                                         Parent / Guardian Signature                                                Date    Vidal BRISENO served as a witness to authentication that the identity of the person signing electronically is in fact the person represented as signing.    This document was generated by Vidal BRISENO on 2025.